# Patient Record
Sex: MALE | Race: WHITE | NOT HISPANIC OR LATINO | Employment: OTHER | URBAN - METROPOLITAN AREA
[De-identification: names, ages, dates, MRNs, and addresses within clinical notes are randomized per-mention and may not be internally consistent; named-entity substitution may affect disease eponyms.]

---

## 2022-04-21 ENCOUNTER — NEW PATIENT (OUTPATIENT)
Dept: URBAN - METROPOLITAN AREA CLINIC 35 | Facility: CLINIC | Age: 77
End: 2022-04-21

## 2022-04-21 DIAGNOSIS — H52.7: ICD-10-CM

## 2022-04-21 DIAGNOSIS — H53.8: ICD-10-CM

## 2022-04-21 PROCEDURE — 92134 CPTRZ OPH DX IMG PST SGM RTA: CPT

## 2022-04-21 PROCEDURE — 92004 COMPRE OPH EXAM NEW PT 1/>: CPT

## 2022-04-21 PROCEDURE — 92015 DETERMINE REFRACTIVE STATE: CPT

## 2022-04-21 ASSESSMENT — VISUAL ACUITY
OD_SC: 20/40+1
OS_SC: 20/400
OD_PH: 20/30-2
OS_PH: 20/40-1
OS_CC: J1
OD_CC: J1

## 2022-04-21 ASSESSMENT — TONOMETRY
OS_IOP_MMHG: 13
OD_IOP_MMHG: 13

## 2022-04-26 ENCOUNTER — CONSULTATION/EVALUATION (OUTPATIENT)
Dept: URBAN - METROPOLITAN AREA CLINIC 39 | Facility: CLINIC | Age: 77
End: 2022-04-26

## 2022-04-26 DIAGNOSIS — H25.813: ICD-10-CM

## 2022-04-26 DIAGNOSIS — H04.123: ICD-10-CM

## 2022-04-26 DIAGNOSIS — Z79.899: ICD-10-CM

## 2022-04-26 PROCEDURE — 99214 OFFICE O/P EST MOD 30 MIN: CPT

## 2022-04-26 PROCEDURE — 92136TC INTERFEROMETRY - TECHNICAL COMPONENT

## 2022-04-26 PROCEDURE — V2799PMN IMPRIMIS PRED-MOXI-NEPAF 5ML

## 2022-04-26 ASSESSMENT — VISUAL ACUITY
OD_SC: 20/40
OD_SC: J5
OS_SC: 20/80-1
OD_CC: J1+
OD_BAT: 20/100
OD_RAM: 20/20
OS_CC: J1
OS_SC: J3
OS_BAT: 20/400
OS_AM: 20/20

## 2022-04-26 ASSESSMENT — TONOMETRY
OD_IOP_MMHG: 16
OS_IOP_MMHG: 17

## 2022-04-26 NOTE — PATIENT DISCUSSION
The patient was informed that with the Basic option, they will most likely need prescription glasses at all focal points after surgery. The patient elects Basic Os, goal of emmetropia.

## 2022-05-06 ENCOUNTER — SURGERY/PROCEDURE (OUTPATIENT)
Dept: URBAN - METROPOLITAN AREA CLINIC 39 | Facility: CLINIC | Age: 77
End: 2022-05-06

## 2022-05-06 ENCOUNTER — POST-OP (OUTPATIENT)
Dept: URBAN - METROPOLITAN AREA CLINIC 39 | Facility: CLINIC | Age: 77
End: 2022-05-06

## 2022-05-06 ENCOUNTER — PRE-OP/H&P (OUTPATIENT)
Dept: URBAN - METROPOLITAN AREA CLINIC 39 | Facility: CLINIC | Age: 77
End: 2022-05-06

## 2022-05-06 DIAGNOSIS — H52.7: ICD-10-CM

## 2022-05-06 DIAGNOSIS — H25.813: ICD-10-CM

## 2022-05-06 DIAGNOSIS — Z96.1: ICD-10-CM

## 2022-05-06 PROCEDURE — 66984 XCAPSL CTRC RMVL W/O ECP: CPT

## 2022-05-06 PROCEDURE — 99211T TECH SERVICE

## 2022-05-06 ASSESSMENT — TONOMETRY
OD_IOP_MMHG: 15
OS_IOP_MMHG: 13

## 2022-05-06 ASSESSMENT — VISUAL ACUITY
OS_SC: 20/60-1
OD_SC: 20/40

## 2022-05-09 ENCOUNTER — POST-OP (OUTPATIENT)
Dept: URBAN - METROPOLITAN AREA CLINIC 35 | Facility: CLINIC | Age: 77
End: 2022-05-09

## 2022-05-09 DIAGNOSIS — H25.813: ICD-10-CM

## 2022-05-09 DIAGNOSIS — H52.7: ICD-10-CM

## 2022-05-09 DIAGNOSIS — Z96.1: ICD-10-CM

## 2022-05-09 PROCEDURE — 99024 POSTOP FOLLOW-UP VISIT: CPT

## 2022-05-09 ASSESSMENT — VISUAL ACUITY: OS_SC: 20/20

## 2022-05-09 ASSESSMENT — TONOMETRY: OS_IOP_MMHG: 16

## 2022-05-11 ENCOUNTER — SURGERY/PROCEDURE (OUTPATIENT)
Dept: URBAN - METROPOLITAN AREA CLINIC 39 | Facility: CLINIC | Age: 77
End: 2022-05-11

## 2022-05-11 ENCOUNTER — PRE-OP/H&P (OUTPATIENT)
Dept: URBAN - METROPOLITAN AREA CLINIC 39 | Facility: CLINIC | Age: 77
End: 2022-05-11

## 2022-05-11 DIAGNOSIS — H04.123: ICD-10-CM

## 2022-05-11 DIAGNOSIS — H25.811: ICD-10-CM

## 2022-05-11 DIAGNOSIS — Z96.1: ICD-10-CM

## 2022-05-11 DIAGNOSIS — Z79.899: ICD-10-CM

## 2022-05-11 DIAGNOSIS — H52.7: ICD-10-CM

## 2022-05-11 DIAGNOSIS — H16.142: ICD-10-CM

## 2022-05-11 PROCEDURE — 66984 XCAPSL CTRC RMVL W/O ECP: CPT

## 2022-05-11 PROCEDURE — 99211T TECH SERVICE

## 2022-05-11 ASSESSMENT — VISUAL ACUITY
OD_SC: J5
OD_BAT: 20/100
OS_SC: J10
OD_RAM: 20/20
OS_SC: 20/20
OD_SC: 20/40

## 2022-05-11 ASSESSMENT — TONOMETRY: OS_IOP_MMHG: 14

## 2022-05-12 ENCOUNTER — POST-OP (OUTPATIENT)
Dept: URBAN - METROPOLITAN AREA CLINIC 35 | Facility: CLINIC | Age: 77
End: 2022-05-12

## 2022-05-12 DIAGNOSIS — Z96.1: ICD-10-CM

## 2022-05-12 PROCEDURE — 99024 POSTOP FOLLOW-UP VISIT: CPT

## 2022-05-12 ASSESSMENT — VISUAL ACUITY
OD_SC: 20/20-1
OS_SC: 20/20

## 2022-05-12 ASSESSMENT — TONOMETRY
OS_IOP_MMHG: 14
OD_IOP_MMHG: 12

## 2022-06-01 ENCOUNTER — POST-OP (OUTPATIENT)
Dept: URBAN - METROPOLITAN AREA CLINIC 35 | Facility: CLINIC | Age: 77
End: 2022-06-01

## 2022-06-01 DIAGNOSIS — Z96.1: ICD-10-CM

## 2022-06-01 PROCEDURE — 99024 POSTOP FOLLOW-UP VISIT: CPT

## 2022-06-01 ASSESSMENT — TONOMETRY
OS_IOP_MMHG: 11
OD_IOP_MMHG: 11

## 2022-06-01 ASSESSMENT — VISUAL ACUITY
OD_SC: 20/20
OS_SC: 20/20-2

## 2022-08-01 NOTE — PATIENT DISCUSSION
The patient was informed that with Symfony blended vision, the near focal point will be at approximately 20 inches with the first eye. Near vision will be achieved with surgery on the second eye. Side effects, specifically halos, reduced contrast, and a 1 in 500 exchange rate due to failure to adapt to the lens were discussed as was the need for enhancement in some cases. The patient elects Advanced Vision with Symfony OD, goal of emmetropia.

## 2023-05-30 ENCOUNTER — COMPREHENSIVE EXAM (OUTPATIENT)
Dept: URBAN - METROPOLITAN AREA CLINIC 35 | Facility: CLINIC | Age: 78
End: 2023-05-30

## 2023-05-30 DIAGNOSIS — Z96.1: ICD-10-CM

## 2023-05-30 DIAGNOSIS — Z79.899: ICD-10-CM

## 2023-05-30 DIAGNOSIS — H04.123: ICD-10-CM

## 2023-05-30 DIAGNOSIS — H25.811: ICD-10-CM

## 2023-05-30 PROCEDURE — 92014 COMPRE OPH EXAM EST PT 1/>: CPT

## 2023-05-30 ASSESSMENT — VISUAL ACUITY
OU_SC: J7
OS_CC: J2
OD_SC: 20/15-1
OD_SC: J10
OS_SC: J7
OU_SC: 20/15
OD_CC: J2
OS_SC: 20/15
OU_CC: J1+

## 2023-05-30 ASSESSMENT — TONOMETRY
OD_IOP_MMHG: 12
OS_IOP_MMHG: 12

## 2024-08-22 ENCOUNTER — COMPREHENSIVE EXAM (OUTPATIENT)
Dept: URBAN - METROPOLITAN AREA CLINIC 35 | Facility: CLINIC | Age: 79
End: 2024-08-22

## 2024-08-22 DIAGNOSIS — H04.123: ICD-10-CM

## 2024-08-22 DIAGNOSIS — Z79.899: ICD-10-CM

## 2024-08-22 DIAGNOSIS — H52.7: ICD-10-CM

## 2024-08-22 DIAGNOSIS — Z96.1: ICD-10-CM

## 2024-08-22 PROCEDURE — 92015 DETERMINE REFRACTIVE STATE: CPT

## 2024-08-22 PROCEDURE — 92014 COMPRE OPH EXAM EST PT 1/>: CPT

## 2024-08-22 ASSESSMENT — VISUAL ACUITY
OS_CC: J1
OD_CC: J1
OU_CC: J1
OD_SC: 20/25
OS_SC: 20/20
OU_SC: 20/20

## 2024-08-22 ASSESSMENT — TONOMETRY
OD_IOP_MMHG: 11
OS_IOP_MMHG: 12

## 2025-02-17 NOTE — PATIENT DISCUSSION
Cont low carb diet and weight loss, 2-4 c. Coffee/d  Protein shakes 2 x day    Long-Term Liver Injury (Cirrhosis): What to Know    Cirrhosis is a long-term (chronic) liver injury. The liver is an organ in your body that has many jobs.  Your liver:  Changes food into energy.  Gets rid of toxic things in your blood.  Makes proteins.  Absorbs vitamins from food.  If you have cirrhosis, scar tissue takes the place of your healthy liver cells. This stops blood from flowing through your liver. It makes it hard for your liver to do what it needs to do.  What are the causes?  Cirrhosis may be caused by:  Hepatitis C.  Drinking a lot of alcohol for a long time.  Diseases, such as:  Nonalcoholic fatty liver disease (NAFLD). It's also called metabolic dysfunction-associated steatotic liver disease (MASLD).  Diseases that block your liver ducts.  Liver diseases you were born with.  Hepatitis B.  Autoimmune hepatitis. This is when your body's defense system (immune system) attacks your liver cells by mistake.  An infection with a parasite.  A reaction to things you've been exposed to for a long time, such as:  Certain medicines you take. These include heart medicines.  Certain toxins. These include organic solvents.  What increases the risk?  You're more likely to get cirrhosis if:  You have hepatitis.  You drink a lot of alcohol for a long time.  You're overweight.  You're male.  You're over 40 years old.  You use IV drugs and share needles.  You have sex without protection, and the other person has hepatitis.  What are the signs or symptoms?  You may not have symptoms at first. Symptoms may start when the damage to your liver gets worse.  Early symptoms may include:  Feeling weak and tired.  Trouble sleeping.  Itchy skin.  Your belly feeling tender.  Losing weight or not getting as hungry as normal.  Feeling like you may throw up.  Muscle loss and cramps.  Later symptoms may include:  Jaundice. This is when your skin or the  Patient is cleared for anesthesia. white parts of your eyes turn yellow.  A buildup of fluid in your belly. Your clothes may fit tight around your waist.  Weight gain and swelling of your feet and ankles.  Trouble breathing.  Easy bruising and bleeding.  Throwing up blood.  Black or bloody poop.  Feeling confused.  How is this diagnosed?  Cirrhosis may be diagnosed based on your symptoms, medical history, and an exam. Your health care provider may feel your liver.  You may also need tests. These may include:  Blood tests to check:  For hepatitis B or C.  How well your kidney works.  How well your liver works.  Imaging tests, such as:  An MRI or CT scan.  An ultrasound. This can check if your liver tissue is being replaced by scar tissue.  How is this treated?  Treatment may depend on how damaged your liver is and what caused the damage. It may mean treating your symptoms or treating the problems that caused the cirrhosis. Treatment may include:  Changes to what you eat and drink. You may need to:  Eat healthy. Work with your provider to make an eating plan.  Not take in as much salt.  Stay at a healthy weight.  Not use drugs or alcohol.  Taking medicines to:  Treat infections.  Help with itching.  Reduce fluid buildup.  Reduce certain toxins in your blood.  Lower your risk of bleeding.  Getting a liver transplant. This is done if you have liver failure.  Follow these instructions at home:  Take your medicines only as told. Do not take medicines that are bad for your liver.  Ask your provider before taking any new medicines. Ask before you take acetaminophen.  Rest as needed.  Eat a healthy diet.  Eat smaller meals every 3-4 hours.  Limit your salt or water intake as told.  Avoid:  Raw or undercooked shellfish, fish, and meat.  Unpasteurized milk and milk products.  Do not drink alcohol.  Keep all follow-up visits. Your provider will check if you're getting better.  Contact a health care provider if:  You have tiredness or weakness that's getting  worse.  You have swelling in:  Your hands.  Your feet.  Your legs.  Fluid builds up in your belly.  You have a fever or chills.  You lose or gain weight.  You throw up or feel like you may throw up.  You get jaundice.  You start to bruise or bleed easily.  Get help right away if:  You throw up, and it looks like:  Bright red blood.  Coffee grounds.  Your poop looks bloody or black.  You get confused.  You have chest pain or trouble breathing.  These symptoms may be an emergency. Call 911 right away.  Do not wait to see if the symptoms will go away.  Do not drive yourself to the hospital.  This information is not intended to replace advice given to you by your health care provider. Make sure you discuss any questions you have with your health care provider.  Document Revised: 06/07/2024 Document Reviewed: 06/07/2024  [employer] Patient Education © 2024 [employer]

## 2025-04-29 ENCOUNTER — COMPREHENSIVE EXAM (OUTPATIENT)
Age: 80
End: 2025-04-29

## 2025-04-29 DIAGNOSIS — Z96.1: ICD-10-CM

## 2025-04-29 DIAGNOSIS — H04.123: ICD-10-CM

## 2025-04-29 DIAGNOSIS — H52.7: ICD-10-CM

## 2025-04-29 PROCEDURE — 92015 DETERMINE REFRACTIVE STATE: CPT

## 2025-04-29 PROCEDURE — 92014 COMPRE OPH EXAM EST PT 1/>: CPT
